# Patient Record
Sex: MALE | Race: BLACK OR AFRICAN AMERICAN | NOT HISPANIC OR LATINO | URBAN - METROPOLITAN AREA
[De-identification: names, ages, dates, MRNs, and addresses within clinical notes are randomized per-mention and may not be internally consistent; named-entity substitution may affect disease eponyms.]

---

## 2023-10-06 PROBLEM — Z79.4 LONG TERM (CURRENT) USE OF INSULIN (MULTI): Status: ACTIVE | Noted: 2022-11-07

## 2023-10-06 PROBLEM — S72.92XA FEMUR FRACTURE, LEFT (MULTI): Status: ACTIVE | Noted: 2023-10-06

## 2023-10-06 PROBLEM — I10 ESSENTIAL (PRIMARY) HYPERTENSION: Status: ACTIVE | Noted: 2022-11-07

## 2023-10-06 PROBLEM — H52.4 PRESBYOPIA: Status: ACTIVE | Noted: 2023-10-06

## 2023-10-06 PROBLEM — Z79.899 OTHER LONG TERM (CURRENT) DRUG THERAPY: Status: ACTIVE | Noted: 2022-11-07

## 2023-10-06 PROBLEM — E11.9 DIABETES MELLITUS (MULTI): Status: ACTIVE | Noted: 2023-10-06

## 2023-10-06 PROBLEM — E11.65 TYPE 2 DIABETES MELLITUS WITH HYPERGLYCEMIA (MULTI): Status: ACTIVE | Noted: 2023-10-06

## 2023-10-06 PROBLEM — M62.81 ABNORMAL GAIT DUE TO MUSCLE WEAKNESS: Status: ACTIVE | Noted: 2023-10-06

## 2023-10-06 PROBLEM — E16.2 LOW BLOOD SUGAR: Status: ACTIVE | Noted: 2023-10-06

## 2023-10-06 PROBLEM — R26.9 ABNORMAL GAIT DUE TO MUSCLE WEAKNESS: Status: ACTIVE | Noted: 2023-10-06

## 2023-10-06 PROBLEM — Z79.84 LONG TERM (CURRENT) USE OF ORAL HYPOGLYCEMIC DRUGS: Status: ACTIVE | Noted: 2022-11-07

## 2023-10-06 PROBLEM — R29.898 WEAKNESS OF BOTH LOWER EXTREMITIES: Status: ACTIVE | Noted: 2023-10-06

## 2023-10-06 PROBLEM — E78.5 HYPERLIPIDEMIA: Status: ACTIVE | Noted: 2023-10-06

## 2023-10-06 PROBLEM — H43.812 VITREOUS DEGENERATION, LEFT EYE: Status: ACTIVE | Noted: 2023-10-06

## 2023-10-06 RX ORDER — PEN NEEDLE, DIABETIC 30 GX3/16"
5 NEEDLE, DISPOSABLE MISCELLANEOUS DAILY
COMMUNITY

## 2023-10-06 RX ORDER — PANTOPRAZOLE SODIUM 40 MG/1
40 TABLET, DELAYED RELEASE ORAL DAILY
COMMUNITY

## 2023-10-06 RX ORDER — AMLODIPINE BESYLATE 10 MG/1
10 TABLET ORAL DAILY
COMMUNITY
Start: 2020-10-05

## 2023-10-06 RX ORDER — INSULIN GLARGINE 100 [IU]/ML
30 INJECTION, SOLUTION SUBCUTANEOUS 2 TIMES DAILY
COMMUNITY
Start: 2021-05-12

## 2023-10-06 RX ORDER — METFORMIN HYDROCHLORIDE 1000 MG/1
1 TABLET ORAL 2 TIMES DAILY
COMMUNITY
Start: 2021-05-12

## 2023-10-06 RX ORDER — LOSARTAN POTASSIUM 25 MG/1
TABLET ORAL
COMMUNITY
Start: 2020-11-04

## 2023-10-06 RX ORDER — INSULIN ASPART 100 [IU]/ML
10-15 INJECTION, SOLUTION INTRAVENOUS; SUBCUTANEOUS 3 TIMES DAILY
COMMUNITY
Start: 2021-05-12

## 2023-10-10 ENCOUNTER — HOSPITAL ENCOUNTER (OUTPATIENT)
Dept: RADIOLOGY | Facility: HOSPITAL | Age: 43
Discharge: HOME | End: 2023-10-10
Payer: COMMERCIAL

## 2023-10-10 ENCOUNTER — OFFICE VISIT (OUTPATIENT)
Dept: ORTHOPEDIC SURGERY | Facility: HOSPITAL | Age: 43
End: 2023-10-10
Payer: COMMERCIAL

## 2023-10-10 VITALS — WEIGHT: 280 LBS | HEIGHT: 74 IN | BODY MASS INDEX: 35.94 KG/M2

## 2023-10-10 DIAGNOSIS — S72.351D CLOSED DISPLACED COMMINUTED FRACTURE OF SHAFT OF RIGHT FEMUR WITH ROUTINE HEALING: ICD-10-CM

## 2023-10-10 DIAGNOSIS — Z87.81 HISTORY OF PELVIC FRACTURE: ICD-10-CM

## 2023-10-10 DIAGNOSIS — Z87.81 HISTORY OF FEMUR FRACTURE: ICD-10-CM

## 2023-10-10 DIAGNOSIS — S32.461D: Primary | ICD-10-CM

## 2023-10-10 PROCEDURE — 72190 X-RAY EXAM OF PELVIS: CPT | Mod: RIGHT SIDE | Performed by: STUDENT IN AN ORGANIZED HEALTH CARE EDUCATION/TRAINING PROGRAM

## 2023-10-10 PROCEDURE — 73552 X-RAY EXAM OF FEMUR 2/>: CPT | Mod: RIGHT SIDE | Performed by: STUDENT IN AN ORGANIZED HEALTH CARE EDUCATION/TRAINING PROGRAM

## 2023-10-10 PROCEDURE — 73552 X-RAY EXAM OF FEMUR 2/>: CPT | Mod: RT,FY

## 2023-10-10 PROCEDURE — 4010F ACE/ARB THERAPY RXD/TAKEN: CPT | Performed by: ORTHOPAEDIC SURGERY

## 2023-10-10 PROCEDURE — 99024 POSTOP FOLLOW-UP VISIT: CPT | Performed by: ORTHOPAEDIC SURGERY

## 2023-10-10 PROCEDURE — 3046F HEMOGLOBIN A1C LEVEL >9.0%: CPT | Performed by: ORTHOPAEDIC SURGERY

## 2023-10-10 PROCEDURE — 72190 X-RAY EXAM OF PELVIS: CPT | Mod: FY

## 2023-10-10 ASSESSMENT — PAIN - FUNCTIONAL ASSESSMENT: PAIN_FUNCTIONAL_ASSESSMENT: 0-10

## 2023-10-10 ASSESSMENT — PAIN SCALES - GENERAL: PAINLEVEL_OUTOF10: 7

## 2023-10-10 NOTE — PROGRESS NOTES
Subjective    Patient ID: Christopher Riggs is a 43 y.o. male.    Chief Complaint: Post-op of the Right Hip     Last Surgery: Right intramedullary nail femur fixation and percutaneous right acetabular fracture fixation last Surgery Date: August 14, 2023    HPI  Patient doing well at this time and currently home.  He has participated in home therapy but was discharged and encouraged to go to outpatient.  His pain is adequately controlled.  He is ambulating partial bearing.  Denies fevers or chills.  Denies drainage from the wound.  ~He/She~ reports no additional symptoms or concerns. No shortness of breath or chest pain. No calf swelling or pain.  Objective   Ortho Exam  Gen: The patient is alert and oriented ×3, is in no acute distress, and appear their stated age and weight.    He is surgical incisions are healing well, without evidence of erythema, fluctuance, drainage, or infection.  The skin around the incision is intact.  Distally neurovascular exam is stable.  There is appropriate tenderness to palpation in the ezekiel-incisional area. No calf swelling or tenderness to palpation.  Image Results:  I personally reviewed right femur radiograph and pelvic radiograph that shows stable fixation of the acetabulum and femur with interval callus formation.  No joint arthrosis.  No secondary loss of fixation.    Assessment/Plan   Encounter Diagnoses:  Closed displaced combined transverse-posterior fracture of right acetabulum with routine healing    History of femur fracture    History of pelvic fracture    Closed displaced comminuted fracture of shaft of right femur with routine healing  Patient is doing well.  He is ambulating.  At this point he may transition to weightbearing as tolerated.  I referred him to outpatient physical therapy range of motion and strengthening exercises.  Activities as tolerated.  We will see him back in about 2 months obtain right femur radiograph and AP pelvis, pelvic Judet radiographs  Orders  Placed This Encounter    XR pelvis 3+ views    XR femur right 2+ views     No follow-ups on file.

## 2024-01-09 ENCOUNTER — APPOINTMENT (OUTPATIENT)
Dept: ORTHOPEDIC SURGERY | Facility: HOSPITAL | Age: 44
End: 2024-01-09
Payer: COMMERCIAL